# Patient Record
Sex: MALE | Race: WHITE | URBAN - METROPOLITAN AREA
[De-identification: names, ages, dates, MRNs, and addresses within clinical notes are randomized per-mention and may not be internally consistent; named-entity substitution may affect disease eponyms.]

---

## 2017-07-08 ENCOUNTER — HOSPITAL ENCOUNTER (EMERGENCY)
Facility: CLINIC | Age: 82
Discharge: HOME | End: 2017-07-08
Attending: PHYSICIAN ASSISTANT | Admitting: PHYSICIAN ASSISTANT
Payer: COMMERCIAL

## 2017-07-08 VITALS
DIASTOLIC BLOOD PRESSURE: 82 MMHG | OXYGEN SATURATION: 96 % | WEIGHT: 187 LBS | SYSTOLIC BLOOD PRESSURE: 161 MMHG | HEART RATE: 85 BPM | RESPIRATION RATE: 16 BRPM | TEMPERATURE: 97.6 F

## 2017-07-08 DIAGNOSIS — M06.9 FLARE OF RHEUMATOID ARTHRITIS (H): ICD-10-CM

## 2017-07-08 LAB — INTERPRETATION ECG - MUSE: NORMAL

## 2017-07-08 PROCEDURE — 93005 ELECTROCARDIOGRAM TRACING: CPT

## 2017-07-08 PROCEDURE — 99283 EMERGENCY DEPT VISIT LOW MDM: CPT

## 2017-07-08 RX ORDER — PREDNISONE 20 MG/1
TABLET ORAL
Qty: 10 TABLET | Refills: 0 | Status: SHIPPED | OUTPATIENT
Start: 2017-07-08

## 2017-07-08 RX ORDER — TRAMADOL HYDROCHLORIDE 50 MG/1
50 TABLET ORAL EVERY 6 HOURS PRN
Qty: 10 TABLET | Refills: 0 | Status: SHIPPED | OUTPATIENT
Start: 2017-07-08

## 2017-07-08 ASSESSMENT — ENCOUNTER SYMPTOMS
FEVER: 0
COLOR CHANGE: 1

## 2017-07-08 NOTE — DISCHARGE INSTRUCTIONS
Rest, elevate hand, ice, start prednisone, you can take tramadol as needed.   Return for worsening pain, high fevers, or any other new concerns.

## 2017-07-08 NOTE — ED AVS SNAPSHOT
[facility] Emergency Department    [address]    [facility] [address]    Phone:  [phone]    Fax:  [phone]                                       Patrice Garcia   MRN: [id number]    Department:  [facility] Emergency Department   Date of Visit:  7/8/2017           After Visit Summary Signature Page     I have received my discharge instructions, and my questions have been answered. I have discussed any challenges I see with this plan with the nurse or doctor.    ..........................................................................................................................................  Patient/Patient Representative Signature      ..........................................................................................................................................  Patient Representative Print Name and Relationship to Patient    ..................................................               ................................................  Date                                            Time    ..........................................................................................................................................  Reviewed by Signature/Title    ...................................................              ..............................................  Date                                                            Time

## 2017-07-08 NOTE — ED NOTES
Pt presents for evaluation of pain in his left hand and elbow.  Reports history of rheumatoid arthritis, was seen at urgent care today and started on Plaquenil.  States the medication isn't helping.

## 2017-07-08 NOTE — ED PROVIDER NOTES
History     Chief Complaint:  Arthritis pain    HPI   Patrice Garcia is a 82 year old male, with history of RA, who presents with arthritis pain. The patient has known RA in both of his hands, and he has frequent flares with associated redness. The patient is visiting from [place], [place], where he receives treatment.  He went to [facility] yesterday, and was prescribed Plaquenil as he forgot his medication back home. Today, he woke up with bilateral hand pain, left greater than right. He reports associated swelling and faint redness to his left hand. He denies any fever. The patient states he does have some Prednisone that was prescribed to him in [place], and Prednisone usually helps with his flares. He does have a history of an irregular heart beat in November, but after his EKG with his PCP, it was determined that the patient has no cardiac condition.     Allergies:  NKDA     Medications:    Plaquenil    Past Medical History:    RA  Shingles    Past Surgical History:    The patient does not have any pertinent past surgical history.    Family History:    No past pertinent family history.    Social History:  Alcohol and tobacco use not on file  Marital Status:  [1]     Review of Systems   Constitutional: Negative for fever.   Musculoskeletal:        Positive for bilateral hand pain   Skin: Positive for color change.   All other systems reviewed and are negative.      Physical Exam     Patient Vitals for the past 24 hrs:   BP Temp Temp src Pulse Resp SpO2 Weight   07/08/17 1411 161/82 97.6  F (36.4  C) Oral 85 16 96 % 84.8 kg (187 lb)        Physical Exam  Nursing note and vitals reviewed.     GENERAL: Alert, mild distress, non toxic appearing.   HEENT: Normal conjunctiva. No scleral icterus. MMM.   NECK: Supple.  CARDIAC: Normal rate and regular rhythm with occasional arrythmia. Normal heart sounds. No murmurs, rubs, or gallops appreciated. Intact distal radial pulses 2+. Cap refill < 2  seconds.   PULMONARY: CTA bilaterally. Normal breath sounds. No wheezing, crackles, or rhonchi appreciated.  NEURO: Alert and oriented. Non-focal. Sensation intact in distal bilateral upper extremities.   MUSCULOSKELETAL:   Left hand: swelling and very faint erythema over dorsum of hand, decreased  strength due to pain, able to flex and extend fingers and left wrist.  Right hand: mild swelling over dorsum of the hand, no overlying warmth or erythema, decreased  strength due to pain. Normal ROM of right wrist and all fingers.   SKIN: Skin is warm and dry. No rashes. No pallor or jaundice.   PSYCH: Normal affect and mood.       Emergency Department Course   ECG:  Indication: Hand Pain  Time: 1442  Vent. Rate 84 bpm. MD interval 150. QRS duration 90. QT/QTc 412/486. P-R-T axis 61 20 53. Sinus rhythm with sinus arrhythmia with frequent premature ventricular complexes. Prolonged QT. Abnormal ECG. Read time: 1443    Emergency Department Course:  Nursing notes and vitals reviewed. I performed an exam of the patient as documented above.     The above workup was undertaken.    1446 I reevaluated the patient and provided an update in regards to his ED course.      Findings and plan explained to the Patient. Patient discharged home with instructions regarding supportive care, medications, and reasons to return. The importance of close follow-up was reviewed. The patient was prescribed Prednisone and Ultram.      Impression & Plan    Medical Decision Making:  Patrice Garcia is a 82 year old male who presents with concern for bilateral hand pain, greatest in left hand with associated swelling. He has a history of RA and his current symptoms are similar to previous flares. He is actually visiting from out of town, and was seen yesterday in Urgent Care as he forgot his daily Plaquenil, which was filled at that time. Here, he is afebrile, hemodynamically stable, and non toxic appearing. He is neurovascularly intact in  bilateral upper extremities. He does have some mild swelling, warmth, and very faint erythema over the dorsum of the left hand. He has decreased strength in bilateral hands. Signs and symptoms most consistent with RA flare up. Very low suspicion for septic joint as he has had no fevers, and he is still able to move his wrist and fingers. Low suspicion for cellulitis given he has had these same symptoms with previous flare ups, and he has had no fevers. Will start him on Prednisone in addition to Tramadol for pain. I reviewed with him to avoid driving or alcohol while taking the pain medication. Follow up with rheumatologist in the next few days to ensure improving. Reviewed reasons to return to ED, including worsening symptoms, high fevers, increasing redness/swelling, or any new concerns. The patient was in agreement with plan and discharged in satisfactory condition with all questions answered.     Of note, nurse notified me that patient's heart sounded irregular. On my exam, he had what appeared to be a benign arrhythmia. Formal ECG performed showing sinus rhythm with sinus arrhythmia. Qtc was minimally prolonged. No clinical history concerning for acute electrolyte abnormality and given no associated symptoms with this, I did not feel this required further evaluation here in the ED. Advised him to follow up with PCP back home. He notes he has been told this in the past as well.     Diagnosis:    ICD-10-CM    1. Flare of rheumatoid arthritis (H), left hand M06.9      Disposition:  Discharged to home    Discharge Medications:  New Prescriptions    PREDNISONE (DELTASONE) 20 MG TABLET    Take two tablets (= 40mg) each day for 5 (five) days    TRAMADOL (ULTRAM) 50 MG TABLET    Take 1 tablet (50 mg) by mouth every 6 hours as needed for pain     Netta BLAIR, am serving as a scribe on 7/8/2017 at 2:22 PM to personally document services performed by Angelica Roche PA-C based on my observations and the  provider's statements to me.     Netta Rivera  7/8/2017   [facility] EMERGENCY DEPARTMENT       Angelica Roche PA-C  07/09/17 1134

## 2017-07-08 NOTE — ED AVS SNAPSHOT
[facility] Emergency Department    [address]    [facility] [address]    Phone:  [phone]    Fax:  [phone]                                       Patrice Garcia   MRN: [id number]    Department:  [facility] Emergency Department   Date of Visit:  7/8/2017           Patient Information     Date Of Birth          1935        Your diagnoses for this visit were:     Flare of rheumatoid arthritis (H), left hand        You were seen by Angelica Roche PA-C.      Follow-up Information     Follow up with [facility] Emergency [facility].    Specialty:  EMERGENCY MEDICINE    Why:  If symptoms worsen    Contact information:    [address]  [facility] [address] [phone]        Follow up with rheumatologist In 3 days.    Why:  if not improving        Discharge Instructions       Rest, elevate hand, ice, start prednisone, you can take tramadol as needed.   Return for worsening pain, high fevers, or any other new concerns.         Discharge References/Attachments     RHEUMATOID ARTHRITIS (ENGLISH)      24 Hour Appointment Hotline       To make an appointment at any [place] clinic, call [phone] ([phone]). If you don't have a family doctor or clinic, we will help you find one. [place] clinics are conveniently located to serve the needs of you and your family.             Review of your medicines      START taking        Dose / Directions Last dose taken    predniSONE 20 MG tablet   Commonly known as:  DELTASONE   Quantity:  10 tablet        Take two tablets (= 40mg) each day for 5 (five) days   Refills:  0        traMADol 50 MG tablet   Commonly known as:  ULTRAM   Dose:  50 mg   Quantity:  10 tablet        Take 1 tablet (50 mg) by mouth every 6 hours as needed for pain   Refills:  0          Our records show that you are taking the medicines listed below. If these are incorrect, please call your family doctor or  clinic.        Dose / Directions Last dose taken    AMLODIPINE BESYLATE PO        Refills:  0        LISINOPRIL PO        Refills:  0        PLAQUENIL PO        Refills:  0                Prescriptions were sent or printed at these locations (2 Prescriptions)                   Other Prescriptions                Printed at Department/Unit printer (2 of 2)         predniSONE (DELTASONE) 20 MG tablet               traMADol (ULTRAM) 50 MG tablet                Procedures and tests performed during your visit     EKG 12 lead      Orders Needing Specimen Collection     None      Pending Results     No orders found from 7/6/2017 to 7/9/2017.            Pending Culture Results     No orders found from 7/6/2017 to 7/9/2017.            Pending Results Instructions     If you had any lab results that were not finalized at the time of your Discharge, you can call the ED Lab Result RN at [phone]. You will be contacted by this team for any positive Lab results or changes in treatment. The nurses are available 7 days a week from 10A to 6:30P.  You can leave a message 24 hours per day and they will return your call.        Test Results From Your Hospital Stay               Clinical Quality Measure: Blood Pressure Screening     Your blood pressure was checked while you were in the emergency department today. The last reading we obtained was  BP: 161/82 . Please read the guidelines below about what these numbers mean and what you should do about them.  If your systolic blood pressure (the top number) is less than 120 and your diastolic blood pressure (the bottom number) is less than 80, then your blood pressure is normal. There is nothing more that you need to do about it.  If your systolic blood pressure (the top number) is 120-139 or your diastolic blood pressure (the bottom number) is 80-89, your blood pressure may be higher than it should be. You should have your blood pressure rechecked within a year by a primary care  "provider.  If your systolic blood pressure (the top number) is 140 or greater or your diastolic blood pressure (the bottom number) is 90 or greater, you may have high blood pressure. High blood pressure is treatable, but if left untreated over time it can put you at risk for heart attack, stroke, or kidney failure. You should have your blood pressure rechecked by a primary care provider within the next 4 weeks.  If your provider in the emergency department today gave you specific instructions to follow-up with your doctor or provider even sooner than that, you should follow that instruction and not wait for up to 4 weeks for your follow-up visit.        Thank you for choosing [facility]       Thank you for choosing [facility] for your care. Our goal is always to provide you with excellent care. Hearing back from our patients is one way we can continue to improve our services. Please take a few minutes to complete the written survey that you may receive in the mail after you visit with us. Thank you!        [facility] Information     [employer] lets you send messages to your doctor, view your test results, renew your prescriptions, schedule appointments and more. To sign up, go to [email or web address] . Click on \"Log in\" on the left side of the screen, which will take you to the Welcome page. Then click on \"Sign up Now\" on the right side of the page.     You will be asked to enter the access code listed below, as well as some personal information. Please follow the directions to create your username and password.     Your access code is: [email or web address]  Expires: 10/6/2017  2:45 PM     Your access code will  in 90 days. If you need help or a new code, please call your [facility] clinic or [phone].        Care EveryWhere ID     This is your Care EveryWhere ID. This could be used by other organizations to access your [facility] medical records  [id number]        Equal Access to Services     ROSMERY THOMPSON: Radha new" sandeep Adan, ag fisher, ric chaviramarenee quevedo, jonathan londono. So [facility] [phone].    ATENCIÓN: Si habla español, tiene a goodrich disposición servicios gratuitos de asistencia lingüística. Llame al [phone].    We comply with applicable federal civil rights laws and [place] laws. We do not discriminate on the basis of race, color, national origin, age, disability sex, sexual orientation or gender identity.            After Visit Summary       This is your record. Keep this with you and show to your community pharmacist(s) and doctor(s) at your next visit.